# Patient Record
Sex: MALE | Race: WHITE | Employment: OTHER | URBAN - METROPOLITAN AREA
[De-identification: names, ages, dates, MRNs, and addresses within clinical notes are randomized per-mention and may not be internally consistent; named-entity substitution may affect disease eponyms.]

---

## 2017-12-01 ENCOUNTER — ESTABLISHED COMPREHENSIVE EXAM (OUTPATIENT)
Dept: URBAN - METROPOLITAN AREA CLINIC 43 | Facility: CLINIC | Age: 77
End: 2017-12-01

## 2017-12-01 DIAGNOSIS — H43.813: ICD-10-CM

## 2017-12-01 DIAGNOSIS — H25.813: ICD-10-CM

## 2017-12-01 DIAGNOSIS — H04.123: ICD-10-CM

## 2017-12-01 PROCEDURE — 99214 OFFICE O/P EST MOD 30 MIN: CPT

## 2017-12-01 PROCEDURE — 4040F PNEUMOC VAC/ADMIN/RCVD: CPT

## 2017-12-01 PROCEDURE — 92015 DETERMINE REFRACTIVE STATE: CPT

## 2017-12-01 PROCEDURE — G8756 NO BP MEASURE DOC: HCPCS

## 2017-12-01 PROCEDURE — G8427 DOCREV CUR MEDS BY ELIG CLIN: HCPCS

## 2017-12-01 PROCEDURE — G8482 FLU IMMUNIZE ORDER/ADMIN: HCPCS

## 2017-12-01 PROCEDURE — 1036F TOBACCO NON-USER: CPT

## 2017-12-01 ASSESSMENT — TONOMETRY
OS_IOP_MMHG: 18
OD_IOP_MMHG: 16

## 2017-12-01 ASSESSMENT — VISUAL ACUITY
OS_SC: J6
OS_BAT: 20/100
OS_SC: 20/50
OD_SC: J8
OD_SC: 20/50
OD_BAT: 20/100

## 2018-01-09 ENCOUNTER — CATARACT CONSULT (OUTPATIENT)
Dept: URBAN - METROPOLITAN AREA CLINIC 43 | Facility: CLINIC | Age: 78
End: 2018-01-09

## 2018-01-09 VITALS
HEIGHT: 60 IN | SYSTOLIC BLOOD PRESSURE: 139 MMHG | RESPIRATION RATE: 18 BRPM | HEART RATE: 61 BPM | DIASTOLIC BLOOD PRESSURE: 95 MMHG

## 2018-01-09 DIAGNOSIS — H04.123: ICD-10-CM

## 2018-01-09 DIAGNOSIS — H25.812: ICD-10-CM

## 2018-01-09 DIAGNOSIS — H18.51: ICD-10-CM

## 2018-01-09 DIAGNOSIS — H43.813: ICD-10-CM

## 2018-01-09 DIAGNOSIS — H25.811: ICD-10-CM

## 2018-01-09 PROCEDURE — 4040F PNEUMOC VAC/ADMIN/RCVD: CPT

## 2018-01-09 PROCEDURE — 92134 CPTRZ OPH DX IMG PST SGM RTA: CPT

## 2018-01-09 PROCEDURE — G8752 SYS BP LESS 140: HCPCS

## 2018-01-09 PROCEDURE — 92025-3 CORNEAL TOPO, REFUSED

## 2018-01-09 PROCEDURE — 1036F TOBACCO NON-USER: CPT

## 2018-01-09 PROCEDURE — 99214 OFFICE O/P EST MOD 30 MIN: CPT

## 2018-01-09 PROCEDURE — 92136TC INTERFEROMETRY - TECHNICAL COMPONENT

## 2018-01-09 PROCEDURE — G8428 CUR MEDS NOT DOCUMENT: HCPCS

## 2018-01-09 PROCEDURE — G8482 FLU IMMUNIZE ORDER/ADMIN: HCPCS

## 2018-01-09 PROCEDURE — 92286 ANT SGM IMG I&R SPECLR MIC: CPT

## 2018-01-09 PROCEDURE — G8755 DIAS BP > OR = 90: HCPCS

## 2018-01-09 RX ORDER — NEPAFENAC 3 MG/ML: 1 SUSPENSION/ DROPS OPHTHALMIC ONCE A DAY

## 2018-01-09 RX ORDER — MOXIFLOXACIN HYDROCHLORIDE 5 MG/ML: 1 SOLUTION/ DROPS OPHTHALMIC

## 2018-01-09 RX ORDER — DUREZOL 0.5 MG/ML: 1 EMULSION OPHTHALMIC TWICE A DAY

## 2018-01-09 ASSESSMENT — VISUAL ACUITY
OS_AM: 20/20
OD_SC: 20/40
OD_BAT: 20/100
OS_SC: J8
OS_SC: 20/50
OS_BAT: 20/100
OD_SC: J6
OD_PAM: 20/20-2
OS_PH: 20/30-2

## 2018-01-09 ASSESSMENT — TONOMETRY
OS_IOP_MMHG: 15
OD_IOP_MMHG: 14

## 2018-02-05 ENCOUNTER — ESTABLISHED PATIENT (OUTPATIENT)
Dept: URBAN - METROPOLITAN AREA CLINIC 39 | Facility: CLINIC | Age: 78
End: 2018-02-05

## 2018-02-05 ENCOUNTER — SURGERY/PROCEDURE (OUTPATIENT)
Dept: URBAN - METROPOLITAN AREA CLINIC 43 | Facility: CLINIC | Age: 78
End: 2018-02-05

## 2018-02-05 DIAGNOSIS — H25.812: ICD-10-CM

## 2018-02-05 PROCEDURE — 99211T TECH SERVICE

## 2018-02-05 PROCEDURE — 66984 XCAPSL CTRC RMVL W/O ECP: CPT

## 2018-02-06 ENCOUNTER — POST-OP (OUTPATIENT)
Dept: URBAN - METROPOLITAN AREA CLINIC 43 | Facility: CLINIC | Age: 78
End: 2018-02-06

## 2018-02-06 DIAGNOSIS — Z96.1: ICD-10-CM

## 2018-02-06 DIAGNOSIS — H25.811: ICD-10-CM

## 2018-02-06 PROCEDURE — 4040F PNEUMOC VAC/ADMIN/RCVD: CPT

## 2018-02-06 PROCEDURE — 99213 OFFICE O/P EST LOW 20 MIN: CPT

## 2018-02-06 PROCEDURE — G8482 FLU IMMUNIZE ORDER/ADMIN: HCPCS

## 2018-02-06 PROCEDURE — 1036F TOBACCO NON-USER: CPT

## 2018-02-06 PROCEDURE — G8756 NO BP MEASURE DOC: HCPCS

## 2018-02-06 PROCEDURE — 99024 POSTOP FOLLOW-UP VISIT: CPT

## 2018-02-06 PROCEDURE — G8427 DOCREV CUR MEDS BY ELIG CLIN: HCPCS

## 2018-02-06 ASSESSMENT — VISUAL ACUITY
OS_SC: 20/40
OS_SC: J12
OD_SC: 20/40-2
OD_SC: J10

## 2018-02-06 ASSESSMENT — TONOMETRY
OD_IOP_MMHG: 16
OS_IOP_MMHG: 15

## 2018-02-12 ENCOUNTER — SURGERY/PROCEDURE (OUTPATIENT)
Dept: URBAN - METROPOLITAN AREA CLINIC 43 | Facility: CLINIC | Age: 78
End: 2018-02-12

## 2018-02-12 ENCOUNTER — POST-OP (OUTPATIENT)
Dept: URBAN - METROPOLITAN AREA CLINIC 39 | Facility: CLINIC | Age: 78
End: 2018-02-12

## 2018-02-12 DIAGNOSIS — H25.811: ICD-10-CM

## 2018-02-12 DIAGNOSIS — Z96.1: ICD-10-CM

## 2018-02-12 PROCEDURE — 99024 POSTOP FOLLOW-UP VISIT: CPT

## 2018-02-12 PROCEDURE — 66984 XCAPSL CTRC RMVL W/O ECP: CPT

## 2018-02-12 ASSESSMENT — TONOMETRY
OD_IOP_MMHG: 16
OS_IOP_MMHG: 18

## 2018-02-12 ASSESSMENT — VISUAL ACUITY
OS_SC: J12
OD_SC: J10
OS_SC: 20/25+1
OD_SC: 20/40-2

## 2018-02-13 ENCOUNTER — POST-OP (OUTPATIENT)
Dept: URBAN - METROPOLITAN AREA CLINIC 43 | Facility: CLINIC | Age: 78
End: 2018-02-13

## 2018-02-13 DIAGNOSIS — Z96.1: ICD-10-CM

## 2018-02-13 PROCEDURE — 99024 POSTOP FOLLOW-UP VISIT: CPT

## 2018-02-13 ASSESSMENT — VISUAL ACUITY
OS_SC: J12
OD_SC: J12
OS_SC: 20/25-1
OD_SC: 20/30-1

## 2018-02-13 ASSESSMENT — TONOMETRY
OS_IOP_MMHG: 16
OD_IOP_MMHG: 16

## 2018-03-02 ENCOUNTER — POST-OP (OUTPATIENT)
Dept: URBAN - METROPOLITAN AREA CLINIC 43 | Facility: CLINIC | Age: 78
End: 2018-03-02

## 2018-03-02 DIAGNOSIS — Z96.1: ICD-10-CM

## 2018-03-02 PROCEDURE — 99024 POSTOP FOLLOW-UP VISIT: CPT

## 2018-03-02 ASSESSMENT — VISUAL ACUITY
OD_SC: 20/40+2
OS_SC: J12
OD_SC: J12-
OS_SC: 20/30

## 2018-03-02 ASSESSMENT — TONOMETRY
OS_IOP_MMHG: 16
OD_IOP_MMHG: 16

## 2019-03-01 ENCOUNTER — ESTABLISHED COMPREHENSIVE EXAM (OUTPATIENT)
Dept: URBAN - METROPOLITAN AREA CLINIC 43 | Facility: CLINIC | Age: 79
End: 2019-03-01

## 2019-03-01 DIAGNOSIS — H26.493: ICD-10-CM

## 2019-03-01 DIAGNOSIS — H04.123: ICD-10-CM

## 2019-03-01 DIAGNOSIS — Z96.1: ICD-10-CM

## 2019-03-01 DIAGNOSIS — H43.813: ICD-10-CM

## 2019-03-01 PROCEDURE — 92015 DETERMINE REFRACTIVE STATE: CPT

## 2019-03-01 PROCEDURE — 92014 COMPRE OPH EXAM EST PT 1/>: CPT

## 2019-03-01 ASSESSMENT — VISUAL ACUITY
OS_SC: J12
OS_SC: 20/30-2
OD_SC: J12
OD_SC: 20/30+2

## 2019-03-01 ASSESSMENT — TONOMETRY
OS_IOP_MMHG: 16
OD_IOP_MMHG: 15

## 2019-03-25 ENCOUNTER — CONSULT (OUTPATIENT)
Dept: URBAN - METROPOLITAN AREA CLINIC 39 | Facility: CLINIC | Age: 79
End: 2019-03-25

## 2019-03-25 ENCOUNTER — SURGERY/PROCEDURE (OUTPATIENT)
Dept: URBAN - METROPOLITAN AREA SURGERY 14 | Facility: SURGERY | Age: 79
End: 2019-03-25

## 2019-03-25 DIAGNOSIS — H26.491: ICD-10-CM

## 2019-03-25 PROCEDURE — 92014 COMPRE OPH EXAM EST PT 1/>: CPT

## 2019-03-25 PROCEDURE — 66821 AFTER CATARACT LASER SURGERY: CPT

## 2019-03-25 ASSESSMENT — VISUAL ACUITY
OD_BAT: 20/50
OS_BAT: 20/50
OS_SC: 20/30
OD_SC: 20/30

## 2019-03-25 ASSESSMENT — TONOMETRY
OS_IOP_MMHG: 15
OD_IOP_MMHG: 15

## 2019-04-01 ENCOUNTER — POST OP/EVAL OF SECOND EYE (OUTPATIENT)
Dept: URBAN - METROPOLITAN AREA CLINIC 39 | Facility: CLINIC | Age: 79
End: 2019-04-01

## 2019-04-01 ENCOUNTER — SURGERY/PROCEDURE (OUTPATIENT)
Dept: URBAN - METROPOLITAN AREA SURGERY 14 | Facility: SURGERY | Age: 79
End: 2019-04-01

## 2019-04-01 DIAGNOSIS — H26.492: ICD-10-CM

## 2019-04-01 PROCEDURE — 99213 OFFICE O/P EST LOW 20 MIN: CPT

## 2019-04-01 PROCEDURE — 66821 AFTER CATARACT LASER SURGERY: CPT

## 2019-04-01 ASSESSMENT — TONOMETRY
OS_IOP_MMHG: 15
OD_IOP_MMHG: 15

## 2019-04-01 ASSESSMENT — VISUAL ACUITY
OS_SC: 20/30
OD_SC: 20/30
OS_BAT: 20/50

## 2019-04-08 ENCOUNTER — YAG POST-OP (OUTPATIENT)
Dept: URBAN - METROPOLITAN AREA CLINIC 43 | Facility: CLINIC | Age: 79
End: 2019-04-08

## 2019-04-08 DIAGNOSIS — Z98.890: ICD-10-CM

## 2019-04-08 PROCEDURE — 99024 POSTOP FOLLOW-UP VISIT: CPT

## 2019-04-08 ASSESSMENT — VISUAL ACUITY
OS_SC: J8+
OD_SC: 20/25-2
OS_SC: 20/25+2
OD_SC: J6-

## 2019-04-08 ASSESSMENT — TONOMETRY
OS_IOP_MMHG: 16
OD_IOP_MMHG: 16

## 2021-06-28 NOTE — PATIENT DISCUSSION
CL appear slightly under corrected from previous rx. Recommend to modify CL prior to considering cataract surgery. If continues bothersome after CL modification may consider cataract surgery.

## 2021-06-28 NOTE — PATIENT DISCUSSION
Patient understands that cataracts will continue to change which will also continue changing the prescription.

## 2021-08-09 NOTE — PATIENT DISCUSSION
Pt tolerated modified vision with CL but she was still bothered by lights, FB sensation, causes her nose to hurt.

## 2021-08-09 NOTE — PATIENT DISCUSSION
Multifocal IOLs have an added po risk of increased glare or halos and there is no guarantee that the pt will not need glasses po or see 20/20. Pt stated does not mind glare at night and wearing glasses for best VA potential. Wishes to proceed with MFIOL.

## 2021-08-09 NOTE — PATIENT DISCUSSION
Highly recommend Custom cataract surgery and keep mono VA since is what pt has adjusted to. Pt wishes to explore other options.

## 2021-08-18 NOTE — PATIENT DISCUSSION
Good postoperative appearance. PT REASSESSED, NO VISIBLE TREMORS NOTED. SINUS TACH RATE 90'S WITH NO ECTOPY ON 
CARDIAC MONITOR. PT HAS NO COMPLAINT AT THIS TIME. LUNG BASES AUSCULTATED, 
CLEAR. CHART UP FOR RECHECK. AWAITING MD AND DISPO.

## 2022-01-25 NOTE — PATIENT DISCUSSION
OCT performed today to document disease process and to follow progression. Discussed RPE changes , microaneursym and possible CME . Recommend retinal consult . Start prolensa QHS , sample provided.

## 2022-01-25 NOTE — PATIENT DISCUSSION
Start Artificial Tears: One drop to both eyes 3-4 times daily. We recommend Systane or Refresh lubricating eye drops which can be found at any pharmacy.

## 2022-03-17 NOTE — PATIENT DISCUSSION
PCO OS.  Indications, risks, benefits and alternatives to YAG capsulotomy discussed with patient. Questions answered. Educational handout given.

## 2022-04-19 NOTE — PATIENT DISCUSSION
Group Topic: BH Recovery Skills    Date: 5/29/2020  Start Time: 1100  End Time: 1145  Facilitators: Susan P Schwabe, RN    Focus: Nursing Check In  Number in attendance: 8    Method: Group  Attendance: Present  Participation: Active  Patient Response: Appropriate feedback, Attentive, Good eye contact and Interactive  Mood: Normal  Affect: Type: Euthymic (normal mood)   Range: Full (normal)   Congruency: Congruent   Stability: Stable  Behavior/Socialization: Guarded  Thought Process: Focused  Task Performance: Follows directions  Patient Evaluation: Independent - full participation   Discussion focused on the quote of the day \"The First Step Towards Getting Somewhere is to Decide that you are not Going to Stay Where you are.\"  Patient added to the discussion on the quote as well as on Eastern Medicine Chakra balancing topic that was introduced to the group.   PCO OS.  Indications, risks, benefits and alternatives to YAG capsulotomy discussed with patient. Questions answered. Educational handout given.

## 2022-05-03 NOTE — PATIENT DISCUSSION
CL given for in between surgeries.  Advised to take off 3 days prior to [facility].

## 2022-05-09 NOTE — PATIENT DISCUSSION
Patient has complained of sinus inflammation recently. tenderness to palpation on the frontal sinus.

## 2022-05-09 NOTE — PATIENT DISCUSSION
Advised patient this is most likely referred pain from the sinuses. Advised patient to follow up with PCP for further evaluation.

## 2022-06-02 NOTE — PATIENT DISCUSSION
Sample of Prolensa provided previously for patient to use once a day in the right eye PRN for comfort.

## 2022-06-02 NOTE — PATIENT DISCUSSION
The risks, benefits, and alternatives to surgery were discussed. The patient elects to proceed with surgery. Discussed risks of scarring being usually minimal and in lid fold mostly.

## 2022-06-02 NOTE — PATIENT DISCUSSION
This visual field clearly demonstrated a minimum of >13% loss of upper field of vision OU, with upper lid skin in repose and elevated by taping of the lid to demonstrate potential correction. This field shows that taping the lids significantly improved this patient's superior field of vision by approximately >30%%, *.